# Patient Record
(demographics unavailable — no encounter records)

---

## 2025-07-03 NOTE — REVIEW OF SYSTEMS
[Negative] : Heme/Lymph [de-identified] : right two skin colored firm tags without subcutaneous component preauricularly

## 2025-07-03 NOTE — HISTORY OF PRESENT ILLNESS
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 +Family history of branchial vestiges no pmhx no shx  no meds  here for surgical consult  denies pain, infection, redness, inflammation.

## 2025-07-24 NOTE — PROCEDURE
[FreeTextEntry6] : Preopdx: right preauricular branchial vestige Procedure: excision and local tissue rearrangement, 1.1cm right preauricular Anesthesia: local 1% w/epi Specimens: to path on formalin No complications  Summary: IC obtained. Branchial vestige demarcated with marking pen.  Anterior based flap designed.  1%lido with epinephrine injected.  15 blade used to incise full thickness.    flap elevated in the subcutaneous plane.   Vestige fuly excised.  Hemostasis obtained with cautery. flap advanced and closed 1.1cm with 5-0 plain gut suture.  bacitracin placed.

## 2025-07-24 NOTE — REASON FOR VISIT
[Parent] : parent [FreeTextEntry1] : Excision and reconstruction of 2 right ear pre-auricular branchial vestige.